# Patient Record
Sex: FEMALE
[De-identification: names, ages, dates, MRNs, and addresses within clinical notes are randomized per-mention and may not be internally consistent; named-entity substitution may affect disease eponyms.]

---

## 2017-04-03 ENCOUNTER — HOSPITAL ENCOUNTER (INPATIENT)
Dept: HOSPITAL 31 - C.ER | Age: 27
LOS: 3 days | Discharge: HOME | DRG: 97 | End: 2017-04-06
Attending: INTERNAL MEDICINE | Admitting: INTERNAL MEDICINE
Payer: MEDICAID

## 2017-04-03 DIAGNOSIS — R10.32: ICD-10-CM

## 2017-04-03 DIAGNOSIS — J20.9: ICD-10-CM

## 2017-04-03 DIAGNOSIS — J45.901: Primary | ICD-10-CM

## 2017-04-03 LAB
ALBUMIN/GLOB SERPL: 1.3 {RATIO} (ref 1–2.1)
ALP SERPL-CCNC: 65 U/L (ref 38–126)
ALT SERPL-CCNC: 31 U/L (ref 9–52)
AST SERPL-CCNC: 24 U/L (ref 14–36)
BACTERIA #/AREA URNS HPF: (no result) /[HPF]
BASOPHILS # BLD AUTO: 0 K/UL (ref 0–0.2)
BASOPHILS NFR BLD: 0.3 % (ref 0–2)
BILIRUB SERPL-MCNC: 0.4 MG/DL (ref 0.2–1.3)
BILIRUB UR-MCNC: NEGATIVE MG/DL
BUN SERPL-MCNC: 9 MG/DL (ref 7–17)
CALCIUM SERPL-MCNC: 9.7 MG/DL (ref 8.6–10.4)
CHLORIDE SERPL-SCNC: 101 MMOL/L (ref 98–107)
CO2 SERPL-SCNC: 24 MMOL/L (ref 22–30)
EOSINOPHIL # BLD AUTO: 0.1 K/UL (ref 0–0.7)
EOSINOPHIL NFR BLD: 0.8 % (ref 0–4)
EOSINOPHIL NFR BLD: 2 % (ref 0–4)
ERYTHROCYTE [DISTWIDTH] IN BLOOD BY AUTOMATED COUNT: 13.1 % (ref 11.5–14.5)
GLOBULIN SER-MCNC: 3.7 GM/DL (ref 2.2–3.9)
GLUCOSE SERPL-MCNC: 105 MG/DL (ref 65–105)
GLUCOSE UR STRIP-MCNC: NORMAL MG/DL
HCT VFR BLD CALC: 43 % (ref 34–47)
KETONES UR STRIP-MCNC: NEGATIVE MG/DL
L PNEUMO1 AG UR QL IA: NEGATIVE
LEUKOCYTE ESTERASE UR-ACNC: (no result) LEU/UL
LYMPHOCYTES # BLD AUTO: 0.5 K/UL (ref 1–4.3)
LYMPHOCYTES NFR BLD AUTO: 4.4 % (ref 20–40)
MCH RBC QN AUTO: 30.2 PG (ref 27–31)
MCHC RBC AUTO-ENTMCNC: 33.4 G/DL (ref 33–37)
MCV RBC AUTO: 90.3 FL (ref 81–99)
MONOCYTES # BLD: 0.1 K/UL (ref 0–0.8)
MONOCYTES NFR BLD: 1.1 % (ref 0–10)
NEUTROPHILS NFR BLD AUTO: 92 % (ref 50–75)
NRBC BLD AUTO-RTO: 0 % (ref 0–2)
PH UR STRIP: 6 [PH] (ref 5–8)
PLATELET # BLD: 318 K/UL (ref 130–400)
PMV BLD AUTO: 7.9 FL (ref 7.2–11.7)
POTASSIUM SERPL-SCNC: 4 MMOL/L (ref 3.6–5.2)
PROT SERPL-MCNC: 8.5 G/DL (ref 6.3–8.3)
PROT UR STRIP-MCNC: NEGATIVE MG/DL
RBC # UR STRIP: NEGATIVE /UL
SODIUM SERPL-SCNC: 139 MMOL/L (ref 132–148)
SP GR UR STRIP: 1 (ref 1–1.03)
TOTAL CELLS COUNTED BLD: 100
UROBILINOGEN UR-MCNC: NORMAL MG/DL (ref 0.2–1)
WBC # BLD AUTO: 11.9 K/UL (ref 4.8–10.8)

## 2017-04-03 RX ADMIN — METHYLPREDNISOLONE SODIUM SUCCINATE SCH MG: 40 INJECTION, POWDER, FOR SOLUTION INTRAMUSCULAR; INTRAVENOUS at 22:30

## 2017-04-03 NOTE — US
EXAM:

  US Pelvis Complete, Transabdominal



CLINICAL HISTORY:

  27 years old, female; Pain; Abdominal pain; Lower abdomen; Additional info: 

Left lq pain



TECHNIQUE:

  Real-time transabdominal pelvic ultrasound (complete) with image 

documentation.



COMPARISON:

  No relevant prior studies available.



FINDINGS:

  Uterus/cervix:  Uterus measures 7.8 x 4.2 x 5.9 cm in size.  No myometrial 

mass.  

  Endometrium:  0.9 cm in thickness.

  Right ovary:  3.5 x 2.1 x 2.4 cm in size.  No mass.  Small follicles.  Normal 

flow.

  Left ovary:  3.4 x 2.2 x 3.0 cm in size.  No mass.  Small follicles.  Normal 

flow.

  Free fluid:  No significant free fluid.

  Bladder:  Unremarkable as visualized.



IMPRESSION:     

1.No acute findings.

2.Non-acute findings are described above.



_______________________________________________



EXAM:

  US Pelvis, Transvaginal



CLINICAL HISTORY:

  27 years old, female; Pain; Abdominal pain; Lower abdomen; Additional info: 

Left lq pain



TECHNIQUE:

  Real-time transvaginal pelvic ultrasound (complete) with image documentation. 

 Transvaginal imaging was used for better evaluation of the endometrium and 

adnexa.



COMPARISON:

  No relevant prior studies available.



FINDINGS:

  Uterus/cervix:  Uterus measures 7.8 x 4.2 x 5.9 cm in size.  No myometrial 

mass.

  Endometrium:  0.9 cm in thickness.

  Right ovary:  3.5 x 2.1 x 2.4 cm in size.  No mass.  Small follicles.  Normal 

flow.

  Left ovary:  3.4 x 2.2 x 3.0 cm in size.  No mass.  Small follicles.  Normal 

flow.

  Free fluid:  No significant free fluid.

  Bladder:  Empty bladder which cannot be evaluated with this probe.



IMPRESSION:     

1.No acute findings.

2.Non-acute findings are described above.

## 2017-04-03 NOTE — RAD
HISTORY:

Cough.  Shortness of breath.



COMPARISON:

No prior.



TECHNIQUE:

Chest PA and lateral



FINDINGS:



LUNGS:

No active pulmonary disease.



PLEURA:

No significant pleural effusion identified. No pneumothorax apparent.



CARDIOVASCULAR:

Normal.



OSSEOUS STRUCTURES:

No significant abnormalities.



VISUALIZED UPPER ABDOMEN:

Normal.



OTHER FINDINGS:

None.



IMPRESSION:

No active disease.

## 2017-04-03 NOTE — C.PDOC
History Of Present Illness


28 y/o female presents to the ED complaining of productive cough, shortness of 

breath, and wheezing x 3 days.  Patient also notes some nasal congestion and 

chills. She reports history of asthma but states she has been using her inhaler 

with no relief. Patient denies chest pain, abdominal pain, nausea, vomiting, 

diarrhea, or other complaints.





Time Seen by Provider: 04/03/17 11:59


Chief Complaint (Nursing): Shortness Of Breath


History Per: Patient


History/Exam Limitations: no limitations


Onset/Duration Of Symptoms: Days (3), Persistent


Current Symptoms Are (Timing): Still Present


Current Respiratory Medications: See Home Med List


Severity: Mild


Associated Symptoms: Chills, Productive Cough





Past Medical History


Reviewed: Historical Data, Nursing Documentation, Vital Signs


Vital Signs: 


 Last Vital Signs











Temp  98.4 F   04/06/17 15:38


 


Pulse  96 H  04/06/17 17:16


 


Resp  18   04/06/17 15:38


 


BP  116/69   04/06/17 15:38


 


Pulse Ox  96   04/06/17 15:38














- Medical History


PMH: Anxiety, Asthma


Surgical History: No Surg Hx


Family History: States: No Known Family Hx





- Social History


Hx Tobacco Use: No


Hx Alcohol Use: No


Hx Substance Use: No





- Immunization History


Hx Tetanus Toxoid Vaccination: No


Hx Influenza Vaccination: Yes


Hx Pneumococcal Vaccination: No





Review Of Systems


Except As Marked, All Systems Reviewed And Found Negative.


Constitutional: Positive for: Chills


ENT: Positive for: Nose Congestion.  Negative for: Ear Pain, Ear Discharge


Cardiovascular: Negative for: Chest Pain, Palpitations


Respiratory: Positive for: Cough, Shortness of Breath, Sputum, Wheezing


Gastrointestinal: Negative for: Nausea, Vomiting, Abdominal Pain, Diarrhea


Genitourinary: Negative for: Dysuria


Skin: Negative for: Rash





Physical Exam





- Physical Exam


Appears: Non-toxic, No Acute Distress, Other (uncomfortable; speaking in full 

sentences)


Skin: Normal Color, Warm, Dry, No Rash


Head: Normacephalic


Eye(s): bilateral: Normal Inspection


Ear(s): Bilateral: Normal


Oral Mucosa: Moist


Throat: Normal, No Erythema, No Exudate


Neck: Normal, Normal ROM, Supple


Chest: Symmetrical


Cardiovascular: Rhythm Regular, Other (tachycardic)


Respiratory: No Accessory Muscle Use, No Rales, No Rhonchi, Wheezing (diffuse 

expiratory wheezing bilaterally), Other (intermittent coughing )


Gastrointestinal/Abdominal: Normal Exam, Bowel Sounds, Soft, No Tenderness


Extremity: Normal ROM, No Pedal Edema, No Calf Tenderness, No Swelling


Neurological/Psych: Oriented x3





ED Course And Treatment





- Laboratory Results


Result Diagrams: 


 04/06/17 08:06





 04/06/17 08:06


ECG: Interpreted By Me, Viewed By Me (sinus tachycardia 113 bpm, normal axis, 

no acute ST/T wave changes)


ECG Interpretation: Abnormal


O2 Sat by Pulse Oximetry: 98 (ra)


Pulse Ox Interpretation: Normal





- Other Rad


  ** Chest X-Ray


X-Ray: Viewed By Me, Read By Radiologist


Interpretation: Accession No. : [id number].  Patient Name / ID : HOME [facility]  / [id number].  Exam Date : 04/03/2017 12:20:58 ( Approved ).  Study 

Comment :  Sex / Age : F  / 27Y.  Creator : Tyrell Hoang MD.  Dictator : 

Tyrell Hoang MD.   :  Approver : Tyrell Hoang MD.  Approver2 :  

Report Date : 04/03/2017 12:51:58.  My Comment :  ******************************

*****************************************************.  HISTORY:  Cough.  

Shortness of breath.  COMPARISON:  No prior.  TECHNIQUE:  Chest PA and lateral.

  FINDINGS:  LUNGS:  No active pulmonary disease.  PLEURA:  No significant 

pleural effusion identified. No pneumothorax apparent.  CARDIOVASCULAR:  

Normal.  OSSEOUS STRUCTURES:  No significant abnormalities.  VISUALIZED UPPER 

ABDOMEN:  Normal.  OTHER FINDINGS:  None.  IMPRESSION:  No active disease.


Progress Note: CXR and Urine POC were ordered.  Patient given Prednisone PO and 

Duoneb treatments.  17:30 - Patient admitted under Dr. Alexander for asthma 

exacerbation.


Reevaluation Time: 14:00


Reassessment Condition: Improved (Patient reassessed, still having significant 

wheeinng.  Blood work ordered and patient given IV magnesium sulfate.)





Critical Care Time





- Critical Care Note


Total Time (in mins): 45


Documented critical care: time excludes all time spent performing seperately 

billable procedures.





Disposition





- Disposition


Disposition: HOSPITALIZED


Disposition Time: 17:30


Condition: STABLE





- Clinical Impression


Clinical Impression: 


 Asthma exacerbation





- Scribe Statement


The provider has reviewed the documentation as recorded by the Scribe (Queenie Lorenzo)


Provider Attestation: 





All medical record entries made by the Scribe were at my direction and 

personally dictated by me. I have reviewed the chart and agree that the record 

accurately reflects my personal performance of the history, physical exam, 

medical decision making, and the department course for this patient. I have 

also personally directed, reviewed, and agree with the discharge instructions 

and disposition.





Decision To Admit





- Pt Status Changed To:


Hospital Disposition Of: Observation





- .


Bed Request Type: Telemetry


Admitting Physician: Addy Hutchison


Patient Diagnosis: 


 Asthma exacerbation

## 2017-04-03 NOTE — CP.PCM.PN
Subjective





- Date & Time of Evaluation


Date of Evaluation: 04/03/17


Time of Evaluation: 21:30





- Subjective


Subjective: 


H&P Dictated #[id number]





Objective





- Vital Signs/Intake and Output


Vital Signs (last 24 hours): 


 











Temp Pulse Resp BP Pulse Ox


 


 97.8 F   118 H  22   126/75   95 


 


 04/03/17 21:01  04/03/17 21:01  04/03/17 21:01  04/03/17 21:01  04/03/17 21:01











- Medications


Medications: 


 Current Medications





Albuterol/Ipratropium (Duoneb 3 Mg/0.5 Mg (3 Ml) Ud)  3 ml INH RQ2 PRN


   PRN Reason: wheezing/sob


   Last Admin: 04/03/17 20:17 Dose:  3 ml


Albuterol/Ipratropium (Duoneb 3 Mg/0.5 Mg (3 Ml) Ud)  3 ml INH RQ6 HANDY


Methylprednisolone (Solu-Medrol)  40 mg IVP Q8 HANDY


Montelukast Sodium (Singulair)  10 mg PO HS HANDY


Pantoprazole Sodium (Protonix Ec Tab)  40 mg PO DAILY HANDY

## 2017-04-03 NOTE — US
EXAM:

  US Abdomen Complete



CLINICAL HISTORY:

  27 years old, female; Pain; Abdominal pain; Epigastric



TECHNIQUE:

  Real-time ultrasound of the abdomen (complete) with image documentation.



COMPARISON:

  No relevant prior studies available.



FINDINGS:

  Liver:  Normal echogenicity.  No mass.  No intrahepatic bile duct dilatation.

  Gallbladder:  No gallstones.  No wall thickening.  No pericholecystic fluid.  

No sonographic Marr's sign.

  Common bile duct:  No dilatation.  No stones.

  Pancreas:  Unremarkable as visualized.

  Kidneys:  Normal echogenicity.  No hydronephrosis.

  Spleen:  No splenomegaly.

  Aorta:  Unremarkable.  No aneurysm.

  Inferior vena cava:  Unremarkable.

  Free fluid:  No significant free fluid.



IMPRESSION:     

1.No acute findings.

2.Non-acute findings are described above.

## 2017-04-04 LAB
ALBUMIN/GLOB SERPL: 1.4 {RATIO} (ref 1–2.1)
ALP SERPL-CCNC: 50 U/L (ref 38–126)
ALT SERPL-CCNC: 22 U/L (ref 9–52)
AST SERPL-CCNC: 24 U/L (ref 14–36)
BASOPHILS # BLD AUTO: 0 K/UL (ref 0–0.2)
BASOPHILS NFR BLD: 0.1 % (ref 0–2)
BILIRUB SERPL-MCNC: 0.6 MG/DL (ref 0.2–1.3)
BUN SERPL-MCNC: 12 MG/DL (ref 7–17)
CALCIUM SERPL-MCNC: 8.8 MG/DL (ref 8.6–10.4)
CHLORIDE SERPL-SCNC: 99 MMOL/L (ref 98–107)
CHOLEST SERPL-MCNC: 162 MG/DL (ref 0–199)
CO2 SERPL-SCNC: 23 MMOL/L (ref 22–30)
EOSINOPHIL # BLD AUTO: 0 K/UL (ref 0–0.7)
EOSINOPHIL NFR BLD: 0 % (ref 0–4)
ERYTHROCYTE [DISTWIDTH] IN BLOOD BY AUTOMATED COUNT: 13.5 % (ref 11.5–14.5)
GLOBULIN SER-MCNC: 3.1 GM/DL (ref 2.2–3.9)
GLUCOSE SERPL-MCNC: 139 MG/DL (ref 65–105)
HCT VFR BLD CALC: 39.8 % (ref 34–47)
LYMPHOCYTES # BLD AUTO: 0.7 K/UL (ref 1–4.3)
LYMPHOCYTES NFR BLD AUTO: 5.3 % (ref 20–40)
MCH RBC QN AUTO: 30 PG (ref 27–31)
MCHC RBC AUTO-ENTMCNC: 33.1 G/DL (ref 33–37)
MCV RBC AUTO: 90.6 FL (ref 81–99)
MONOCYTES # BLD: 0.3 K/UL (ref 0–0.8)
MONOCYTES NFR BLD: 2.5 % (ref 0–10)
NEUTROPHILS NFR BLD AUTO: 94 % (ref 50–75)
NRBC BLD AUTO-RTO: 0 % (ref 0–2)
PLATELET # BLD: 316 K/UL (ref 130–400)
PMV BLD AUTO: 8.5 FL (ref 7.2–11.7)
POTASSIUM SERPL-SCNC: 4.5 MMOL/L (ref 3.6–5.2)
PROT SERPL-MCNC: 7.5 G/DL (ref 6.3–8.3)
SODIUM SERPL-SCNC: 140 MMOL/L (ref 132–148)
TOTAL CELLS COUNTED BLD: 100
WBC # BLD AUTO: 13.1 K/UL (ref 4.8–10.8)

## 2017-04-04 RX ADMIN — METHYLPREDNISOLONE SODIUM SUCCINATE SCH MG: 40 INJECTION, POWDER, FOR SOLUTION INTRAMUSCULAR; INTRAVENOUS at 06:00

## 2017-04-04 RX ADMIN — IPRATROPIUM BROMIDE AND ALBUTEROL SULFATE SCH ML: .5; 3 SOLUTION RESPIRATORY (INHALATION) at 19:56

## 2017-04-04 RX ADMIN — IPRATROPIUM BROMIDE AND ALBUTEROL SULFATE SCH ML: .5; 3 SOLUTION RESPIRATORY (INHALATION) at 08:17

## 2017-04-04 RX ADMIN — PROMETHAZINE HYDROCHLORIDE AND DEXTROMETHORPHAN HYDROBROMIDE PRN ML: 15; 6.25 SYRUP ORAL at 21:39

## 2017-04-04 RX ADMIN — METHYLPREDNISOLONE SODIUM SUCCINATE SCH MG: 40 INJECTION, POWDER, FOR SOLUTION INTRAMUSCULAR; INTRAVENOUS at 21:25

## 2017-04-04 RX ADMIN — METHYLPREDNISOLONE SODIUM SUCCINATE SCH MG: 40 INJECTION, POWDER, FOR SOLUTION INTRAMUSCULAR; INTRAVENOUS at 13:58

## 2017-04-04 RX ADMIN — PANTOPRAZOLE SODIUM SCH MG: 40 TABLET, DELAYED RELEASE ORAL at 09:43

## 2017-04-04 RX ADMIN — IPRATROPIUM BROMIDE AND ALBUTEROL SULFATE SCH: .5; 3 SOLUTION RESPIRATORY (INHALATION) at 01:53

## 2017-04-04 RX ADMIN — IPRATROPIUM BROMIDE AND ALBUTEROL SULFATE SCH ML: .5; 3 SOLUTION RESPIRATORY (INHALATION) at 00:03

## 2017-04-04 RX ADMIN — IPRATROPIUM BROMIDE AND ALBUTEROL SULFATE SCH ML: .5; 3 SOLUTION RESPIRATORY (INHALATION) at 13:29

## 2017-04-04 NOTE — CP.PCM.PN
Subjective





- Date & Time of Evaluation


Date of Evaluation: 04/04/17


Time of Evaluation: 10:20





- Subjective


Subjective: 


Progress note dictated #[id number]





Objective





- Vital Signs/Intake and Output


Vital Signs (last 24 hours): 


 











Temp Pulse Resp BP Pulse Ox


 


 97.9 F   96 H  20   119/56 L  98 


 


 04/04/17 07:10  04/04/17 07:10  04/04/17 07:10  04/04/17 07:10  04/04/17 07:10











- Medications


Medications: 


 Current Medications





Acetaminophen (Tylenol 325mg Tab)  650 mg PO Q6 PRN


   PRN Reason: Pain, moderate (4-7)


   Last Admin: 04/04/17 10:34 Dose:  650 mg


Albuterol/Ipratropium (Duoneb 3 Mg/0.5 Mg (3 Ml) Ud)  3 ml INH RQ2 PRN


   PRN Reason: wheezing/sob


   Last Admin: 04/03/17 20:17 Dose:  3 ml


Albuterol/Ipratropium (Duoneb 3 Mg/0.5 Mg (3 Ml) Ud)  3 ml INH RQ6 HANDY


   Last Admin: 04/04/17 08:17 Dose:  3 ml


Ceftriaxone Sodium 1 gm/ (Sodium Chloride)  100 mls @ 100 mls/hr IVPB DAILY HANDY


   Last Admin: 04/04/17 09:43 Dose:  100 mls/hr


Methylprednisolone (Solu-Medrol)  40 mg IVP Q8 HANDY


   Last Admin: 04/04/17 06:00 Dose:  40 mg


Montelukast Sodium (Singulair)  10 mg PO HS HANDY


   Last Admin: 04/03/17 22:29 Dose:  10 mg


Pantoprazole Sodium (Protonix Ec Tab)  40 mg PO DAILY HANDY


   Last Admin: 04/04/17 09:43 Dose:  40 mg











- Labs


Labs: 


 





 04/04/17 06:04 





 04/04/17 06:04

## 2017-04-04 NOTE — PN
DATE: 04/04/2017



The patient was seen and examined at bedside this morning.  The patient is feeling better than yester
day, less shortness of breath, but still wheezing, still complaining of cough.



PHYSICAL EXAMINATION:

GENERAL:  Young female lying in bed in no acute distress.

VITAL SIGNS:  Blood pressure 112/62, pulse 76, respirations 18, temperature 97.6 degrees Fahrenheit, 
O2 sat 98% on 2 liters nasal cannula.

HEENT:  Pupils equal, round, reacting to light and accommodation.  Extraocular muscles intact.  No ic
terus, no pallor.  No oral thrush.  No pharyngeal congestion.

NECK:  Supple.  No JVD.

LUNGS:  Bilateral vesicular breath sounds.  Bilateral coarse wheezing.  No rhonchi heard.

CARDIOVASCULAR:  S1, S2 present, regular.

ABDOMEN:  Soft, nontender.  Bowel sounds present.  No guarding, no rigidity, no rebound tenderness no
dony.

CENTRAL NERVOUS SYSTEM:  Alert, awake, oriented x 3.  No focal deficits noted.

EXTREMITIES:  No edema.  Palpable peripheral pulses.



MEDICATIONS:  Include Tylenol 650 mg p.o. q. 6 hours p.r.n., DuoNeb, Rocephin 1 gram daily, Solu-Medr
ol 40 mg IV q. 8 hours, Singulair 10 mg p.o. at bedtime, Protonix 40 mg p.o. daily.



LABORATORY DATA:  From this morning, WBC 13.1, hemoglobin 13.2, hematocrit 39.8, platelets 316.  [place]odi
um 140, potassium 4.5, chloride 99, bicarb 23, BUN 12, creatinine 0.5, glucose 139, calcium 8.8, tota
l bilirubin 0.6, AST 24, ALT 22, alkaline phosphatase 50, total protein 7.5, albumin 4.4.  Cholestero
l 162.  Triglycerides 40, LDL 84, HDL 51.  UA negative.  Legionella negative.  Mycoplasma negative.  
Abdominal ultrasound negative for anything.



ASSESSMENT AND PLAN:  Young female with asthma exacerbation, acute bronchitis.  The patient still has
 wheezing.  We will continue with the nebulizer treatment, Solu-Medrol, oxygen, Singulair.  Continue 
with Rocephin empirically for acute bronchitis.





__________________________________________

Addy Hutchison MD







cc:



DD: 04/04/2017 19:11:58  [id number]

TT: 04/04/2017 19:43:59

Confirmation # [id number]

Dictation # [id number]

rn

## 2017-04-04 NOTE — HP
CHIEF COMPLAINT:  Progressive worsening of shortness of breath associated with 
chest tightness for the past 4 days.



HISTORY OF PRESENT ILLNESS:  The patient is a 27-year-old female with past 
medical history of asthma for many years, not steroid dependent, never intubated
, has been following up with Dr. Coleman as a primary care physician and 
following up with allergist.  Came in to the ED with progressive worsening of 
shortness of breath, wheezing, associated cough, which is dry, and chest 
tightness, fever up to 100.3.  Over the past 4 days, she has been having dry 
cough, progressive shortness of breath, has been using nebulizer treatments 
multiple times at home, which did not give any relief, which made her come 
here.  She denies any headache, dizziness.  She denies any nausea or vomiting, 
complaining of left lower quadrant abdominal pain.  Denies any urinary 
complaints.  Denies any leg pains or leg cramps.  She claims that she had a 
miscarriage about 2 months ago, at which time she had an asthma exacerbation 
and her urine test was negative.  Later, blood test was positive and her last 
menstrual period is  and she did not get her period yet.



PAST MEDICAL HISTORY:  As described asthma.



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 
breasts.



FAMILY HISTORY:  CVA in grandmother.  Grandfather  from CAD and end-stage 
renal disease, was on hemodialysis.



PERSONAL HISTORY:  She is single, but sexually active, trying to conserve while 
she lives with her parents, working as a  for [employer].



SOCIAL HISTORY:  Denies smoking, alcohol or drug abuse.



ALLERGIES:  SHE HAS FOOD ALLERGIES.



HOME MEDICATIONS:  Albuterol inhaler.



REVIEW OF SYSTEMS:  As described in history of present illness.  All other 
systems reviewed and were found to be negative.



PHYSICAL EXAMINATION:

GENERAL:  A young female lying in bed in no acute distress.

VITAL SIGNS:  Blood pressure 126/75, pulse 103, respirations 22, temperature 
98.2 degrees Fahrenheit, O2 sats 96% on 2 liters nasal cannula.

HEENT:  Pupils equal, round, reacting to light and accommodation.  Extraocular 
muscles intact.  No icterus, no pallor, no oral thrush.  No pharyngeal 
congestion.

NECK:  Supple.  No JVD, no thyromegaly.

CHEST:  Moving equally bilaterally on respiration.

LUNGS:  Bilateral vesicular breath sounds, decreased breath sounds, wheezing 
all over the chest noted.

CARDIOVASCULAR:  S1, S2 present, regular.

ABDOMEN:  Soft, nontender.  Bowel sounds present.  Left lower quadrant 
tenderness noted.

CENTRAL NERVOUS SYSTEM:  Alert, awake, oriented x 3.  No focal deficits noted.

EXTREMITIES:  No edema.  Palpable peripheral pulses.



LABORATORY DATA:  Labs done from the ED:  WBC 11.9, hemoglobin 14.4, hematocrit 
43, platelets 318, sodium 139, potassium 4.0, chloride 101, bicarbonate 24, BUN 
9, creatinine 1.6, glucose 105, calcium 9.7, total bilirubin 0.4, AST 24, ALT 37
, alkaline phosphatase 65, protein 8.5, albumin 4.8, globulin 3.7.  EKG sinus 
tachycardia, no acute ST-T changes noted.  Chest x-ray negative for any 
infiltrate.



ASSESSMENT AND PLAN:  A young female with history of asthma, admitted for 4-day 
history of progressive worsening of shortness of breath, cough, fever, not 
responding to nebulizer treatments at home.  In the ED, the patient received 
multiple nebulizer treatments without 

significant improvement.  The patient is being admitted for further management.



1.  Acute asthma exacerbation.

2.  Acute bronchitis.

3.  Left lower quadrant pain, rule out any intra-abdominal pathology.



PLAN:  The patient is being admitted to telemetry.  Will continue with DuoNeb 
every 6 hours.  We will give Solu-Medrol 40 mg IV every 8 hours, start Singulair
,  start Protonix for GI prophylaxis, give cough medicine as needed for cough.  
Continue with Rocephin pending sputum, blood culture, Gram stain.  Will add 
further  recommendation as her clinical course progresses.





__________________________________________

Addy Hutchison MD







cc:   



DD: 2017 21:43:10  [id number]

TT: 2017 02:50:00

Job # [id number]

kathryn MOSHER

## 2017-04-05 RX ADMIN — PROMETHAZINE HYDROCHLORIDE AND DEXTROMETHORPHAN HYDROBROMIDE PRN ML: 15; 6.25 SYRUP ORAL at 05:20

## 2017-04-05 RX ADMIN — IPRATROPIUM BROMIDE AND ALBUTEROL SULFATE SCH ML: .5; 3 SOLUTION RESPIRATORY (INHALATION) at 08:02

## 2017-04-05 RX ADMIN — IPRATROPIUM BROMIDE AND ALBUTEROL SULFATE SCH ML: .5; 3 SOLUTION RESPIRATORY (INHALATION) at 19:34

## 2017-04-05 RX ADMIN — IPRATROPIUM BROMIDE AND ALBUTEROL SULFATE SCH ML: .5; 3 SOLUTION RESPIRATORY (INHALATION) at 01:14

## 2017-04-05 RX ADMIN — METHYLPREDNISOLONE SODIUM SUCCINATE SCH MG: 40 INJECTION, POWDER, FOR SOLUTION INTRAMUSCULAR; INTRAVENOUS at 05:13

## 2017-04-05 RX ADMIN — PROMETHAZINE HYDROCHLORIDE AND DEXTROMETHORPHAN HYDROBROMIDE PRN ML: 15; 6.25 SYRUP ORAL at 11:28

## 2017-04-05 RX ADMIN — PANTOPRAZOLE SODIUM SCH MG: 40 TABLET, DELAYED RELEASE ORAL at 09:24

## 2017-04-05 RX ADMIN — METHYLPREDNISOLONE SODIUM SUCCINATE SCH MG: 40 INJECTION, POWDER, FOR SOLUTION INTRAMUSCULAR; INTRAVENOUS at 13:26

## 2017-04-05 RX ADMIN — IPRATROPIUM BROMIDE AND ALBUTEROL SULFATE SCH ML: .5; 3 SOLUTION RESPIRATORY (INHALATION) at 13:42

## 2017-04-05 NOTE — CARD
--------------- APPROVED REPORT --------------





EKG Measurement

Heart [id number]

IN [id number]

[id number]

[id number]

[id number]



<Conclusion>

Sinus tachycardia

Otherwise normal ECG

## 2017-04-05 NOTE — CP.PCM.PN
Subjective





- Date & Time of Evaluation


Date of Evaluation: 04/05/17


Time of Evaluation: 11:20





- Subjective


Subjective: 


Progress note dictated #[id number]





Objective





- Vital Signs/Intake and Output


Vital Signs (last 24 hours): 


 











Temp Pulse Resp BP Pulse Ox


 


 98 F   76   17   99/58 L  99 


 


 04/05/17 07:35  04/05/17 07:35  04/05/17 07:35  04/05/17 07:35  04/05/17 07:35








Intake and Output: 


 











 04/05/17 04/05/17





 06:59 18:59


 


Intake Total 150 


 


Balance 150 














- Medications


Medications: 


 Current Medications





Acetaminophen (Tylenol 325mg Tab)  650 mg PO Q6 PRN


   PRN Reason: Pain, moderate (4-7)


   Last Admin: 04/05/17 11:27 Dose:  650 mg


Albuterol/Ipratropium (Duoneb 3 Mg/0.5 Mg (3 Ml) Ud)  3 ml INH RQ2 PRN


   PRN Reason: wheezing/sob


   Last Admin: 04/03/17 20:17 Dose:  3 ml


Albuterol/Ipratropium (Duoneb 3 Mg/0.5 Mg (3 Ml) Ud)  3 ml INH RQ6 HANDY


   Last Admin: 04/05/17 08:02 Dose:  3 ml


Ceftriaxone Sodium 1 gm/ (Sodium Chloride)  100 mls @ 100 mls/hr IVPB DAILY HANDY


   Last Admin: 04/05/17 09:24 Dose:  100 mls/hr


Methylprednisolone (Solu-Medrol)  60 mg IVP Q8 HANDY


Montelukast Sodium (Singulair)  10 mg PO HS HANDY


   Last Admin: 04/04/17 21:18 Dose:  10 mg


Pantoprazole Sodium (Protonix Ec Tab)  40 mg PO DAILY HANDY


   Last Admin: 04/05/17 09:24 Dose:  40 mg


Promethazine HCl/Dextromethorphan (Phenergan Dm Syrup)  5 ml PO Q6H PRN


   PRN Reason: Cough


   Last Admin: 04/05/17 11:28 Dose:  5 ml

## 2017-04-05 NOTE — PN
DATE: 04/05/2017



SUBJECTIVE:  The patient was seen and examined at bedside.  The patient is complaining of tightness i
n the chest and worsening symptoms with worsening wheezing and persistent cough.  All other systems n
egative.



PHYSICAL EXAMINATION:

GENERAL:  Young female lying in bed in no acute distress.

VITAL SIGNS:  Blood pressure 118/69, pulse 82, respirations 20, temperature 98.8 degrees Fahrenheit, 
O2 sats 96% on 2 liters nasal cannula.

HEENT:  Pupils equal, round, reacting to light and accommodation.  Extraocular muscles intact.  No ic
terus.  No pallor.

NECK:  Supple.  No JVD.

LUNGS:  Bilateral vesicular breath sounds, decreased breath sounds with worsening wheezing.

CARDIOVASCULAR:  S1, S2 present, regular.

ABDOMEN:  Soft, nontender.  Bowel sounds present.  No guarding, no rigidity, no rebound tenderness no
dony.

CENTRAL NERVOUS SYSTEM:  Alert, awake, oriented x 3.  No focal deficits noted.

EXTREMITIES:  No edema.  Palpable peripheral pulses.



MEDICATIONS:  Include Tylenol as needed, DuoNeb, Rocephin 1 gram IV daily, Solu-Medrol 60 mg IV q. 8 
hours, Singulair 10 mg daily, Protonix 40 mg daily, Phenergan syrup 5 mL p.o. q. 6 hours p.r.n. for c
ough.



LABORATORY DATA:  No new labs.  Gram stain sputum, a few gram-positive cocci and many PMNs and WBCs.



ASSESSMENT AND PLAN:  A young female with history of asthma, admitted for acute asthma exacerbation, 
acute bronchitis with worsening symptoms than yesterday.  We will continue with nebulizer treatments.
  We will increase Solu-Medrol to 60 mg IV q. 8 hours.  We will continue with O2 and cough medicine. 
 Continue with Rocephin empirically.  Will request pulmonary evaluation.  Will repeat labs in a.m.





__________________________________________

Addy Hutchison MD







cc:



DD: 04/05/2017 16:36:37  [id number]

TT: 04/05/2017 17:00:44

Confirmation # [id number]

Dictation # [id number]

la

## 2017-04-06 VITALS — RESPIRATION RATE: 18 BRPM

## 2017-04-06 VITALS — TEMPERATURE: 98.4 F | DIASTOLIC BLOOD PRESSURE: 69 MMHG | SYSTOLIC BLOOD PRESSURE: 116 MMHG

## 2017-04-06 VITALS — HEART RATE: 96 BPM

## 2017-04-06 LAB
ALBUMIN/GLOB SERPL: 1.3 {RATIO} (ref 1–2.1)
ALP SERPL-CCNC: 54 U/L (ref 38–126)
ALT SERPL-CCNC: 16 U/L (ref 9–52)
AST SERPL-CCNC: 23 U/L (ref 14–36)
BASOPHILS # BLD AUTO: 0 K/UL (ref 0–0.2)
BASOPHILS NFR BLD: 0.2 % (ref 0–2)
BILIRUB SERPL-MCNC: 0.3 MG/DL (ref 0.2–1.3)
BUN SERPL-MCNC: 14 MG/DL (ref 7–17)
CALCIUM SERPL-MCNC: 9.1 MG/DL (ref 8.6–10.4)
CHLORIDE SERPL-SCNC: 97 MMOL/L (ref 98–107)
CO2 SERPL-SCNC: 24 MMOL/L (ref 22–30)
EOSINOPHIL # BLD AUTO: 0 K/UL (ref 0–0.7)
EOSINOPHIL NFR BLD: 0 % (ref 0–4)
ERYTHROCYTE [DISTWIDTH] IN BLOOD BY AUTOMATED COUNT: 13.3 % (ref 11.5–14.5)
GLOBULIN SER-MCNC: 3.4 GM/DL (ref 2.2–3.9)
GLUCOSE SERPL-MCNC: 130 MG/DL (ref 65–105)
HCT VFR BLD CALC: 44.2 % (ref 34–47)
LYMPHOCYTES # BLD AUTO: 1.2 K/UL (ref 1–4.3)
LYMPHOCYTES NFR BLD AUTO: 6.7 % (ref 20–40)
MCH RBC QN AUTO: 30.2 PG (ref 27–31)
MCHC RBC AUTO-ENTMCNC: 32.8 G/DL (ref 33–37)
MCV RBC AUTO: 92 FL (ref 81–99)
MONOCYTES # BLD: 0.4 K/UL (ref 0–0.8)
MONOCYTES NFR BLD: 2 % (ref 0–10)
NEUTROPHILS NFR BLD AUTO: 91 % (ref 50–75)
NRBC BLD AUTO-RTO: 0 % (ref 0–2)
PLATELET # BLD: 410 K/UL (ref 130–400)
PMV BLD AUTO: 8.2 FL (ref 7.2–11.7)
POTASSIUM SERPL-SCNC: 4.5 MMOL/L (ref 3.6–5.2)
PROT SERPL-MCNC: 8 G/DL (ref 6.3–8.3)
SODIUM SERPL-SCNC: 138 MMOL/L (ref 132–148)
TOTAL CELLS COUNTED BLD: 100
WBC # BLD AUTO: 18.1 K/UL (ref 4.8–10.8)

## 2017-04-06 RX ADMIN — IPRATROPIUM BROMIDE AND ALBUTEROL SULFATE SCH ML: .5; 3 SOLUTION RESPIRATORY (INHALATION) at 08:43

## 2017-04-06 RX ADMIN — PROMETHAZINE HYDROCHLORIDE AND DEXTROMETHORPHAN HYDROBROMIDE PRN ML: 15; 6.25 SYRUP ORAL at 09:17

## 2017-04-06 RX ADMIN — IPRATROPIUM BROMIDE AND ALBUTEROL SULFATE SCH: .5; 3 SOLUTION RESPIRATORY (INHALATION) at 02:54

## 2017-04-06 RX ADMIN — PROMETHAZINE HYDROCHLORIDE AND DEXTROMETHORPHAN HYDROBROMIDE PRN ML: 15; 6.25 SYRUP ORAL at 18:13

## 2017-04-06 RX ADMIN — IPRATROPIUM BROMIDE AND ALBUTEROL SULFATE SCH ML: .5; 3 SOLUTION RESPIRATORY (INHALATION) at 13:32

## 2017-04-06 RX ADMIN — IPRATROPIUM BROMIDE AND ALBUTEROL SULFATE SCH: .5; 3 SOLUTION RESPIRATORY (INHALATION) at 19:08

## 2017-04-06 RX ADMIN — METHYLPREDNISOLONE SODIUM SUCCINATE SCH MG: 40 INJECTION, POWDER, FOR SOLUTION INTRAMUSCULAR; INTRAVENOUS at 13:34

## 2017-04-06 RX ADMIN — METHYLPREDNISOLONE SODIUM SUCCINATE SCH MG: 40 INJECTION, POWDER, FOR SOLUTION INTRAMUSCULAR; INTRAVENOUS at 05:43

## 2017-04-06 RX ADMIN — PANTOPRAZOLE SODIUM SCH MG: 40 TABLET, DELAYED RELEASE ORAL at 09:17

## 2017-04-06 NOTE — CP.PCM.PN
Subjective





- Date & Time of Evaluation


Date of Evaluation: 04/06/17


Time of Evaluation: 10:30





- Subjective


Subjective: 


Pt was seen and examined at the bedside breathing comfortably on nasal canulla.

  Pt complained of continued but improved SOB only with exertion, productive 

cough (phlegm yellow), chest pain only with cough, chest congestion.  Pt denies 

fever or chills.  Pt describes improved sleep hygiene (hours of uninterrupted 

sleep) with O2 use in the hospital.  The new onset R leg pain from yesterday 

remitted overnight.





Objective





- Vital Signs/Intake and Output


Vital Signs (last 24 hours): 


 











Temp Pulse Resp BP Pulse Ox


 


 97.9 F   68   18   116/72   97 


 


 04/06/17 07:00  04/06/17 08:00  04/06/17 07:00  04/06/17 07:00  04/06/17 07:00








Intake and Output: 


 











 04/06/17 04/06/17





 06:59 18:59


 


Intake Total 100 


 


Balance 100 














- Medications


Medications: 


 Current Medications





Acetaminophen (Tylenol 325mg Tab)  650 mg PO Q6 PRN


   PRN Reason: Pain, moderate (4-7)


   Last Admin: 04/05/17 19:39 Dose:  650 mg


Albuterol/Ipratropium (Duoneb 3 Mg/0.5 Mg (3 Ml) Ud)  3 ml INH RQ2 PRN


   PRN Reason: wheezing/sob


   Last Admin: 04/03/17 20:17 Dose:  3 ml


Albuterol/Ipratropium (Duoneb 3 Mg/0.5 Mg (3 Ml) Ud)  3 ml INH RQ6 HANDY


   Last Admin: 04/06/17 08:43 Dose:  3 ml


Ceftriaxone Sodium 1 gm/ (Sodium Chloride)  100 mls @ 100 mls/hr IVPB DAILY HANDY


   Last Admin: 04/06/17 09:17 Dose:  100 mls/hr


Methylprednisolone (Solu-Medrol)  60 mg IVP Q8 HANDY


   Last Admin: 04/06/17 05:43 Dose:  60 mg


Montelukast Sodium (Singulair)  10 mg PO HS HANDY


   Last Admin: 04/04/17 21:18 Dose:  10 mg


Pantoprazole Sodium (Protonix Ec Tab)  40 mg PO DAILY HANDY


   Last Admin: 04/06/17 09:17 Dose:  40 mg


Promethazine HCl/Dextromethorphan (Phenergan Dm Syrup)  5 ml PO Q6H PRN


   PRN Reason: Cough


   Last Admin: 04/06/17 09:17 Dose:  5 ml











- Labs


Labs: 


 





 04/06/17 08:06 





 04/06/17 08:06 











- Constitutional


Appears: Well, No Acute Distress





- Head Exam


Head Exam: ATRAUMATIC, NORMOCEPHALIC





- Respiratory Exam


Respiratory Exam: Rhonchi (bilateral lower lobes), Wheezes (bilateral lower 

lobe and R upper lobe), NORMAL BREATHING PATTERN.  absent: Clear to Ausculation 

Bilateral





- Cardiovascular Exam


Cardiovascular Exam: +S1, +S2.  absent: Murmur





- Neurological Exam


Neurological Exam: Alert, Awake, Oriented x3





- Psychiatric Exam


Psychiatric exam: Normal Affect, Normal Mood





- Skin


Skin Exam: Dry, Intact, Normal Color, Warm





Assessment and Plan


(1) Asthma exacerbation


Assessment & Plan: 


CXR 4-3-17: No active disease


R Venous Doppler: no abnormal finding


Peek flow observed to be 300





Clear for discharge with prednisone taper, singular, and inhaled steroid


Recommend outpatient follow-up with CXR; home O2 not indicated and recommend 

reassessment outpatient.


Status: Acute

## 2017-04-06 NOTE — CP.PCM.PN
Subjective





- Date & Time of Evaluation


Date of Evaluation: 04/06/17


Time of Evaluation: 11:20





- Subjective


Subjective: 


Discharge summary dictated #[id number]





Objective





- Vital Signs/Intake and Output


Vital Signs (last 24 hours): 


 











Temp Pulse Resp BP Pulse Ox


 


 98.4 F   96 H  18   116/69   96 


 


 04/06/17 15:38  04/06/17 17:16  04/06/17 15:38  04/06/17 15:38  04/06/17 15:38








Intake and Output: 


 











 04/06/17 04/07/17





 18:59 06:59


 


Intake Total 700 


 


Balance 700 














- Labs


Labs: 


 





 04/06/17 08:06 





 04/06/17 08:06

## 2017-04-06 NOTE — VASCLAB
PROCEDURE:  Right Lower Extremity Venous Duplex Exam. 



HISTORY:

Leg swelling



PRIORS:

None. 



TECHNIQUE:

Right common femoral, femoral, popliteal and posterior tibial, 

peroneal and great saphenous veins were evaluated. Flow was assessed 

with color Doppler, compressibility, assessment of phasic flow and 

augmentation response.



Report prepared by   MARIANNA Silver, RVT



FINDINGS:



RIGHT:

1. Common Femoral Vein: 



1.1. Compressibility - Fully compressible: Thrombus -  None: Flow - 

Phasic: Augmentation -Normal: Reflux - None.



2. Femoral Vein: 



2.1. Compressibility - Fully compressible: Thrombus -  None: Flow - 

Phasic: Augmentation -Normal: Reflux - None.



3. Popliteal Vein: 



3.1. Compressibility - Fully compressible: Thrombus -  None: Flow - 

Phasic: Augmentation -Normal: Reflux - None.



4. Posterior Tibial Vein: 



4.1. Compressibility - Fully compressible: Thrombus -  None: Flow - 

Phasic: Augmentation -Normal: Reflux - None.



5. Peroneal Vein: 



5.1. Compressibility - Fully compressible: Thrombus - None:  Flow - 

Phasic: Augmentation -Normal: Reflux - None.



6. Great Saphenous Vein:

6.1.  Compressibility - Fully compressible: Thrombus -None: Flow - 

Phasic: Augmentation - Normal: Reflux - None.





OTHER FINDINGS:  



IMPRESSION:

No evidence of deep or superficial vein thrombosis of the right lower 

extremity with excellent venous flow. Normal valve function noted of 

the right side.     



Normal venous flow noted in the left common femoral vein.

## 2017-04-07 NOTE — DS
DISCHARGE DIAGNOSES:  Acute asthma exacerbation, acute bronchitis.



HISTORY OF PRESENT ILLNESS:  The patient is a 27-year-old female with past medical history of asthma 
admitted for acute asthma exacerbation.  Today, the patient is feeling better.  Her breathing is much
 improved.  Cough is better and is anxious to be discharged.  Denies any headache, dizziness.  Denies
 any chest pain.  Denies any nausea, vomiting, abdominal pain, diarrhea or constipation.  Denies any 
urinary complaints.  Denies any leg pains or leg cramps.  Denies any other neurologic symptoms.



PHYSICAL EXAMINATION:

GENERAL:  Young female lying in bed in no acute distress.

VITAL SIGNS:  Blood pressure 116/69, pulse 84, respiration 18, temperature 98.4 degrees Fahrenheit, O
2 sats 96% on 2 L nasal cannula.

HEENT:  Pupils equal, round, reacting to light and accommodation.  Extraocular muscles intact.  No ic
terus, no pallor.  No oral thrush.  No pharyngeal congestion.

NECK:  Supple.  No JVD.

LUNGS:  Bilateral vesicular breath sounds.  Bilateral occasional wheezing heard, improved breath soun
ds.

ABDOMEN:  Soft, nontender.  Bowel sounds present.  

CARDIOVASCULAR:  S1, S2 present, regular.

CENTRAL NERVOUS SYSTEM:  Alert, awake, oriented x 3.  No focal deficits noted.

EXTREMITIES:  No edema.  Palpable peripheral pulses.



LABORATORY DATA:  From this morning, WBC 18.1, hemoglobin 13.5, hematocrit 44.2, platelets 410.  Sodi
um 138, potassium 4.5, chloride 97, bicarb 24, BUN 14, creatinine 0.6, glucose 130, calcium 9.1, AST 
23, ALT 16, alkaline phosphatase 54.  Total protein 8.0, albumin 4.6.  IgE is 103.  Mycoplasma negati
ve, legionella negative.  Lower extremity venous Doppler negative for DVT.  Abdomen and pelvis ultras
ound negative.  Chest x-ray negative for any infiltrate.



HOSPITAL COURSE:  The patient was admitted for asthma exacerbation.  The patient was started on nebul
izer treatments, IV Solu-Medrol, empiric antibiotics and cough medicine.  The patient's symptoms impr
abundio, but yesterday her symptoms got worse, so the patient's Solu-Medrol was increased to 60 mg IV q.
 8 hours and pulmonary consult requested.  With the increased doses of steroids, the patient is clini
mai better and improving and improving in  her symptoms.  The patient is anxious to be discharged. 
 The patient was found to be having elevated WBC count without any evidence of obvious source of infe
ction.  It was considered the elevated WBC count, probably secondary to steroid use and increased dos
e of steroids.  The patient complained of left lower quadrant abdominal pain for which she underwent 
ultrasound which was negative for any acute pathology and also patient started having period after sh
e had ultrasound done.  The patient is anxious to be discharged and does not want to wait until a.m. 
to be discharged and the patient understands the need for repeat labs and she said that she would fol
low up with her PMD and have repeat labs done next week.  As the patient is otherwise clinically feel
ing better and the patient is cleared by pulmonary for discharge, the patient is being discharged.



CONDITION UPON DISCHARGE:  The patient is alert, awake, oriented x 3 and hemodynamically stable.



DISCHARGE INSTRUCTIONS:  Follow up with PMD, follow up with pulmonary.  She needs repeat CBC and CMP 
and script was given for repeat labs.



DISCHARGE DIET:  Regular.



ACTIVITY:  As tolerated.



DISCHARGE MEDICATIONS:  Tapering doses of prednisone 40 mg p.o. daily for 3 days, 30 mg p.o. daily fo
r 3 days, 20 mg p.o. daily for 3 days and 10 mg p.o. daily for 3 days and then discontinue.  Ventolin
, Singulair 10 mg daily, promethazine cough syrup 5 mL q. 6 hours as needed, DuoNeb nebulizer, Augmen
tin 875 mg p.o. b.i.d. for 3 days.  I advised the patient to return to the ED if any worsening of her
 symptoms.





__________________________________________

Addy Hutchison MD







cc:



DD: 04/06/2017 22:41:47  [id number]

TT: 04/07/2017 11:04:14

Job # [id number]

tn

## 2017-04-14 VITALS — OXYGEN SATURATION: 98 %

## 2018-01-19 ENCOUNTER — HOSPITAL ENCOUNTER (EMERGENCY)
Dept: HOSPITAL 31 - C.ER | Age: 28
Discharge: HOME | End: 2018-01-19
Payer: MEDICAID

## 2018-01-19 VITALS — RESPIRATION RATE: 14 BRPM | SYSTOLIC BLOOD PRESSURE: 110 MMHG | DIASTOLIC BLOOD PRESSURE: 70 MMHG

## 2018-01-19 VITALS — TEMPERATURE: 98.2 F | HEART RATE: 80 BPM

## 2018-01-19 DIAGNOSIS — J45.909: Primary | ICD-10-CM

## 2018-01-19 LAB
BILIRUB UR-MCNC: NEGATIVE MG/DL
GLUCOSE UR STRIP-MCNC: NORMAL MG/DL
LEUKOCYTE ESTERASE UR-ACNC: (no result) LEU/UL
PH UR STRIP: 6 [PH] (ref 5–8)
PROT UR STRIP-MCNC: NEGATIVE MG/DL
RBC # UR STRIP: NEGATIVE /UL
SP GR UR STRIP: 1.01 (ref 1–1.03)
SQUAMOUS EPITHIAL: 1 /HPF (ref 0–5)
URINE NITRATE: NEGATIVE
UROBILINOGEN UR-MCNC: NORMAL MG/DL (ref 0.2–1)

## 2018-01-19 NOTE — C.PDOC
History Of Present Illness


28 year old female 7 weeks pregnant presents to the ED c/o wheezing, body aches 

for a few days. Patient is G 1 P 0, and denies any abdominal complaints.


Time Seen by Provider: 01/19/18 04:36


Chief Complaint (Nursing): Flu-like Symptoms


History Per: Patient


History/Exam Limitations: no limitations


Onset/Duration Of Symptoms: Days


Current Symptoms Are (Timing): Still Present


Location Of Pain: Diffuse Myalgias


Sick Contacts (Context): None


Associated Symptoms: Other (wheezing)


Ear Symptoms: Bilateral: None


Recent travel outside of the [place]: No


Additional History Per: Patient





Past Medical History


Reviewed: Historical Data, Nursing Documentation, Vital Signs


Vital Signs: 


 Last Vital Signs











Temp  98.2 F   01/19/18 04:42


 


Pulse  80   01/19/18 06:35


 


Resp  14   01/19/18 06:35


 


BP  110/70   01/19/18 06:35


 


Pulse Ox  100   01/19/18 06:35














- Medical History


PMH: Anxiety, Asthma


   Denies: Chronic Kidney Disease


Surgical History: No Surg Hx


Family History: States: Unknown Family Hx





- Social History


Hx Tobacco Use: No


Hx Alcohol Use: No


Hx Substance Use: No





- Immunization History


Hx Tetanus Toxoid Vaccination: No


Hx Influenza Vaccination: Yes


Hx Pneumococcal Vaccination: No





Review Of Systems


Except As Marked, All Systems Reviewed And Found Negative.


Constitutional: Positive for: Other (body aches)


Respiratory: Positive for: Wheezing





Physical Exam





- Physical Exam


Appears: Non-toxic, No Acute Distress, Other (speaking in full sentences)


Skin: Normal Color, Warm, Dry


Head: Atraumatic, Normacephalic


Eye(s): bilateral: Normal Inspection


Nose: No Discharge, No Deformity


Oral Mucosa: Moist


Neck: Normal ROM, Supple


Chest: Symmetrical


Cardiovascular: Rhythm Regular, No Murmur


Respiratory: No Rales, No Rhonchi, Wheezing (Minimal scattered )


Gastrointestinal/Abdominal: Soft, No Tenderness, No Guarding, No Rebound


Extremity: Normal ROM, No Pedal Edema, No Calf Tenderness, No Deformity, No 

Swelling


Neurological/Psych: Oriented x3, Normal Speech, Normal Cognition


Gait: Steady





ED Course And Treatment


O2 Sat by Pulse Oximetry: 97 (On RA)


Pulse Ox Interpretation: Normal





Medical Decision Making


Medical Decision Making: 


Impression : body aches, wheezing


Plan:


* Albuterol 3 ml INH


* Prednisone 50 mg PO


* Influenza A B test


* UA





On reevaluation patient's lungs were CTA, patient was offered an abdomen US but 

she refused and states she does not want to wait in the ED. Patient reports she 

feels good enough for d/c. 





Disposition





- Disposition


Disposition: HOME/ ROUTINE


Disposition Time: 07:00


Condition: STABLE


Additional Instructions: 


please follow up with your doctor. return to er with worsening symptoms or 

concerns. 


Prescriptions: 


Albuterol 0.083% [Albuterol 0.083% Inhal Sol (2.5 mg/3 ml) UD] 2.5 mg IH Q4 PRN 

#20 neb


 PRN Reason: Wheezing


Prednisone 50 mg PO DAILY #5 tablet


Instructions:  Asthma (DC), Viral Syndrome (ED)


Forms:  [facility] (English), Work Excuse





- Clinical Impression


Clinical Impression: 


 Asthma








- Scribe Statement


The provider has reviewed the documentation as recorded by the Scribe





Ollie Abbasi





All medical record entries made by the Scribe were at my direction and 

personally dictated by me. I have reviewed the chart and agree that the record 

accurately reflects my personal performance of the history, physical exam, 

medical decision making, and the department course for this patient. I have 

also personally directed, reviewed, and agree with the discharge instructions 

and disposition.

## 2018-01-21 VITALS — OXYGEN SATURATION: 97 %

## 2018-05-30 ENCOUNTER — HOSPITAL ENCOUNTER (EMERGENCY)
Dept: HOSPITAL 31 - C.EROB | Age: 28
Discharge: HOME | End: 2018-05-30
Payer: MEDICAID

## 2018-05-30 VITALS — RESPIRATION RATE: 16 BRPM | TEMPERATURE: 98.2 F | OXYGEN SATURATION: 100 %

## 2018-05-30 DIAGNOSIS — H10.11: ICD-10-CM

## 2018-05-30 DIAGNOSIS — R10.9: ICD-10-CM

## 2018-05-30 DIAGNOSIS — O26.892: Primary | ICD-10-CM

## 2018-05-30 DIAGNOSIS — Z3A.26: ICD-10-CM

## 2018-05-30 NOTE — C.PDOC
History Of Present Illness


29y/o female w/PMhx of asthma, GiP0, 26 weeks pregnant, uncomplicated pregnancy

, comes in for evaluation of sudden onset right eye redness associated with 

some swelling, that developed 30-40 minutes prior to arrival. Patient states 

the redness is now improved but the eye still itches. She cannot recall any 

possible allergen exposure today. Also reports she developed some sharp left-

sided abdominal pain while here in the ED. Otherwise she denies headache, 

dizziness, visual changes, blurry vision, eye discharges, throat swelling or 

tightness, cough, CP, wheezing, SOB, back pain, UTI sx, denies vaginal 

discharge or bleeding. Ambulate to Ed for evaluation, not in any apparent 

distress.





Time Seen by Provider: 05/30/18 16:12


Chief Complaint (Nursing): Allergic Reaction


History Per: Patient


History/Exam Limitations: no limitations


Onset/Duration Of Symptoms: Mins


Current Symptoms Are (Timing): Still Present





Past Medical History


Reviewed: Historical Data, Nursing Documentation, Vital Signs


Vital Signs: 


 Last Vital Signs











Temp  98.2 F   05/30/18 16:03


 


Pulse  82   05/30/18 16:03


 


Resp  16   05/30/18 16:03


 


BP  123/70   05/30/18 16:03


 


Pulse Ox  100   05/30/18 17:27














- Medical History


PMH: Anxiety, Asthma


   Denies: Chronic Kidney Disease


Other Surgeries: Breast lumpectomy


Family History: States: Unknown Family Hx





- Social History


Hx Tobacco Use: No


Hx Alcohol Use: No


Hx Substance Use: No





- Immunization History


Hx Tetanus Toxoid Vaccination: No


Hx Influenza Vaccination: Yes


Hx Pneumococcal Vaccination: No





Review Of Systems


Except As Marked, All Systems Reviewed And Found Negative.


Eyes: Positive for: Redness, Other (right eye swelling).  Negative for: Vision 

Change


ENT: Negative for: Throat Swelling


Respiratory: Negative for: Shortness of Breath, Wheezing


Gastrointestinal: Positive for: Abdominal Pain


Genitourinary: Negative for: Vaginal Discharge, Vaginal Bleeding


Neurological: Negative for: Headache, Dizziness





Physical Exam





- Physical Exam


Appears: Well, Non-toxic, No Acute Distress


Skin: Normal Color, Warm


Head: Normacephalic


Eye(s): bilateral: PERRL


Ear(s): Left: Normal, Right: Other (mild infraorbital edema. No conjuctival 

injuection or discahrges. no periorbital erythema.)


Nose: No Flaring, Discharge (B/L congestion with scant clear rhinorrhea )


Oral Mucosa: Moist, No Drooling


Tongue: Normal Appearing


Lips: Normal Appearing


Throat: No Erythema, No Drooling, Other (uvula midline, no edema.)


Neck: Supple


Cardiovascular: Rhythm Regular, No Murmur, No JVD


Respiratory: No Decreased Breath Sounds, No Accessory Muscle Use, No Stridor, 

Wheezing (scatteerd Right base wheezing, exp.)


Gastrointestinal/Abdominal: Soft, Other (Gravid, Uterine height 23 cm.)


Back: No CVA Tenderness


Extremity: Normal ROM, No Pedal Edema


Neurological/Psych: Oriented x3, Normal Speech





ED Course And Treatment


O2 Sat by Pulse Oximetry: 100 (RA)


Pulse Ox Interpretation: Normal


Progress Note: On re-eval, pt is afebrile, hemodynamicaly stable.  Non-toxic. 

Tolerate Po well in ED.  PulsEOx 100% RA.  ENT: no acute findings. Uvula midline

, no edema.  Right eye: mild inferior orbital edema noted, no conjuctival 

injection, no discharges. No periorbital erythema.  Neck: Supple, (-) JVD.  

Lungs: CTA B/L, BS equal B/L.  Abd: Gravid.  Neurologicaly intact.  Pt has 

clinical findings c/w Right allergic conjunctivitis r/o seasonal allergy. Hx of 

asthma.  Pt willl be transfer to L&D for further evaluation/monitoring of left 

sided abdominal pain.





Disposition





- Disposition


Disposition: HOSPITALIZED


Disposition Time: 17:04


Condition: STABLE


Additional Instructions: 


take Benadryl as prescribed


Follow up with PMD in 2-3 days for re-evaluation.


return to ED if nay worsening or new changes.


Prescriptions: 


DiphenhydrAMINE [Benadryl] 25 mg PO BID #10 cap


Instructions:  Seasonal Allergies in Adults, Conjunctivitis (Noninfectious 

Pinkeye)


Forms:  [facility] (English)





- Clinical Impression


Clinical Impression: 


 Allergic conjunctivitis








- PA / NP / Resident Statement


MD/DO has reviewed & agrees with the documentation as recorded.





- Scribe Statement


The provider has reviewed the documentation as recorded by the Scribe (Radha Ramos)





All medical record entries made by the Scribe were at my direction and 

personally dictated by me. I have reviewed the chart and agree that the record 

accurately reflects my personal performance of the history, physical exam, 

medical decision making, and the department course for this patient. I have 

also personally directed, reviewed, and agree with the discharge instructions 

and disposition.

## 2018-06-05 VITALS — SYSTOLIC BLOOD PRESSURE: 124 MMHG | DIASTOLIC BLOOD PRESSURE: 63 MMHG | HEART RATE: 81 BPM
